# Patient Record
Sex: FEMALE | URBAN - METROPOLITAN AREA
[De-identification: names, ages, dates, MRNs, and addresses within clinical notes are randomized per-mention and may not be internally consistent; named-entity substitution may affect disease eponyms.]

---

## 2018-07-16 ENCOUNTER — PES CALL (OUTPATIENT)
Dept: ADMINISTRATIVE | Facility: CLINIC | Age: 83
End: 2018-07-16

## 2019-08-14 ENCOUNTER — PES CALL (OUTPATIENT)
Dept: ADMINISTRATIVE | Facility: CLINIC | Age: 84
End: 2019-08-14

## 2020-08-11 ENCOUNTER — TELEPHONE (OUTPATIENT)
Dept: INTERNAL MEDICINE | Facility: CLINIC | Age: 85
End: 2020-08-11

## 2020-08-11 NOTE — TELEPHONE ENCOUNTER
Brian ambrosio I speak to Isaura Flores,    [facility] I am calling to schedule you for your yearly Enhanced Annual Wellness Visit that's requested by your insurance.    The call is being recorded for quality and training purposes.     The Enhanced Annual Wellness Visit benefit is in addition to your annual visit with your doctor. You will meet with a Abhay Awan NP who is on Lily Zambrano MD (Inactive) time and she will review your medications and ask questions regarding your past medical history as well as your current medical status. This is a proactive benefit that allows your care team to identify any health concerns that you may not be aware of. Also Abhay Awan NP will also be identify additional health screenings such as hearing, mobility, or fall screening if you are at risk. This is a benefit afforded once a year with no copay or out of pocket expense to you.     We understand during this time you may have questions about what we are doing to ensure the safety of our patients and our employees for clinic visits and in-home visits. We would like you to know if there are apprehensions about going into the clinic Isaura Flores What [facility] is also practicing clinic precautions such as socially distancing stickers throughout the clinics, temperature check, and hand sanitizing stations upon entering the clinic, and more.     Is there a data and time frame you would like to come in?    Your appointment have been schedule for  for . Your appointment will be held at [address] [address] 8th floor [address].      You can call [phone] (Celina) if you have any questions or need to reschedule your appointment.     You will receive your appointment reminders as usual.   May I assist you with anything else.   Thank you, Isaura lFores and have a great day!    Pt is  since . Changed patient  status in chart